# Patient Record
Sex: MALE | Race: WHITE | HISPANIC OR LATINO | Employment: FULL TIME | URBAN - METROPOLITAN AREA
[De-identification: names, ages, dates, MRNs, and addresses within clinical notes are randomized per-mention and may not be internally consistent; named-entity substitution may affect disease eponyms.]

---

## 2023-08-20 ENCOUNTER — OFFICE VISIT (OUTPATIENT)
Dept: URGENT CARE | Facility: URGENT CARE | Age: 68
End: 2023-08-20
Payer: COMMERCIAL

## 2023-08-20 VITALS
TEMPERATURE: 97.2 F | DIASTOLIC BLOOD PRESSURE: 72 MMHG | RESPIRATION RATE: 12 BRPM | WEIGHT: 258 LBS | HEART RATE: 74 BPM | OXYGEN SATURATION: 97 % | SYSTOLIC BLOOD PRESSURE: 145 MMHG

## 2023-08-20 DIAGNOSIS — Z20.822 EXPOSURE TO 2019 NOVEL CORONAVIRUS: Primary | ICD-10-CM

## 2023-08-20 PROBLEM — Z56.9: Status: ACTIVE | Noted: 2023-08-20

## 2023-08-20 PROCEDURE — 87635 SARS-COV-2 COVID-19 AMP PRB: CPT | Performed by: PHYSICIAN ASSISTANT

## 2023-08-20 PROCEDURE — 99203 OFFICE O/P NEW LOW 30 MIN: CPT | Performed by: PHYSICIAN ASSISTANT

## 2023-08-20 NOTE — LETTER
[facility]  [address] [facility] [address]  Phone: [phone]    August 20, 2023        Keshav Nicole  [address] [facility] [address]          To whom it may concern:    RE: Keshav Nicole    To Whom it May Concern:    Keshav was evaluated today for possible exposure to COVID-19. He was tested today and is awaiting results. Please excuse him from work missed if this test is positive or he develops symptoms.     If his test is negative, he does not need to follow any restrictions such as masking or quarantine.    For more information, visit:   https://www.cdc.gov/coronavirus/2019-ncov/if-you-are-sick/isolation.html  https://www.cdc.gov/coronavirus/2019-ncov/daily-life-coping/nkyjpttkr-ni-nxni.html    Please contact me for questions or concerns.    Sincerely,      Zhane Thorpe PA-C

## 2023-08-20 NOTE — PATIENT INSTRUCTIONS
Testing occurred August 20, 2023  If COVID test is positive:  Stay home and away from others through August 25  You may be around others wearing a well fitting mask on August 26-30  Your isolation restrictions are over on August 31 as long as your symptoms are improving and you have been fever free for 24 hours, even if you still test positive for COVID.  However, if you test negative twice, at least 48 hours apart between days 6-10, you can stop wearing your mask earlier    Visit the CDC websites for more information and most up to date guidelines:  www.cdc.gov/coronavirus/2019-ncov/your-health/isolation.html  www.cdc.gov/coronavirus/2019-ncov/hcp/duration-isolation.html

## 2023-08-20 NOTE — PROGRESS NOTES
Assessment & Plan     Exposure to 2019 novel coronavirus  - Asymptomatic COVID-19 Virus (Coronavirus) by PCR Nose    We discussed this is likely not even a low risk exposure because he does not believe he was not with the coworker within the 48 hours before developing symptoms. Testing today, results will be back tomorrow. If negative, he does not need to mask or quarantine.     Return for visit with primary care provider if not improving.     Zhane Thorpe PA-C  [facility]    Subjective   Keshav Nicole is a 68 year old who presents for the following health issues     Patient presents with:  Covid 19 Testing: Per pt was with someone who tested positive at work, they shared a car together for a few hours. Per company policy he must test for covid, and needs to be cleared for work.   Form Request: Work note needed. For testing, due to the results delay.    HPI    Keshav presents to clinic today needing a COVID test.  On August 14 and 15, he feared her car with a coworker.  His coworker then went on to travel for work to a few different locations.  Yesterday, August 19, his coworker tested positive for COVID-19.  He had no symptoms while with Keshav on the 14th and 15th.  Due to company policy, Keshav needs to be tested for COVID-19 and mask for 10 days. He has no symptoms.    Review of Systems   ROS negative except as stated above.      Objective    BP (!) 145/72   Pulse 74   Temp 97.2  F (36.2  C) (Tympanic)   Resp 12   Wt 117 kg (258 lb)   SpO2 97%   Physical Exam   GENERAL: healthy, alert and no distress    No results found for any visits on 08/20/23.

## 2023-08-21 LAB — SARS-COV-2 RNA RESP QL NAA+PROBE: NEGATIVE
